# Patient Record
(demographics unavailable — no encounter records)

---

## 2024-10-09 NOTE — DISCUSSION/SUMMARY
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  - referral given for repeat DEXA scan - discussed other non-hormonal options available for osteoporosis aside from HRT

## 2024-10-09 NOTE — HISTORY OF PRESENT ILLNESS
[FreeTextEntry1] :    23 yo with  roque syndrome presents for follow up. Reports is unsure if mosaic or classical roque syndrome. States began going through puberty around 12-14 yo with breast development and pubic hair, states she had 1 period in middle school and then never again. She was seen by a GYN approx 8-9 years ago who recommended OCP's but she never took them  She had a bone density scan done 4/2022 showing osteopenia. H/o T2dm on insulin, recently started taking Mounjaro 1-2 months ago. Most recent A1c improved from previous H/o ulcerative colitis  She has never been sexually active and never had a pelvic exam.  She was previously seen and prescribed HRT however did not take them. She is following with hepatology for elevated liver enzymes with negative work up, likely hepatic steatosis.   She denies any complaints today.

## 2024-12-12 NOTE — PHYSICAL EXAM
[Obese] : obese [No Acute Distress] : no acute distress [No Respiratory Distress] : no respiratory distress [No Accessory Muscle Use] : no accessory muscle use [Regular Rhythm] : with a regular rhythm [No Edema] : no peripheral edema [Oriented x3] : oriented to person, place, and time

## 2025-01-30 NOTE — ASSESSMENT
[FreeTextEntry1] : The patient had Mckeon's Syndrome, obesity, DM and ulcerative colitis. She had Covid in 2020 and DM and ulcerative colitis was diagnosed at that time. She has a history of having a heart murmur and she has been followed by cardiology at [facility]. She has a murmur at the LSB without change with respiration. She has normal BP with equal pulses throughout. She has increased LDL and HDL. LFT's transaminases are increased significantly cannot go on statins.  CAC score is 0 however. The patient has a NAFL and is followed by hepatology. She has gained weight. Echo shows as well as exam that the patient is essentially euvolemic. She may have a bicuspid AV with very mild gradient. In view of not being able to see the AV structure and there is a gradient will get a CMR for this. This will better delineate anatomy. Will get venous doppler of legs.  The patient has a bicuspid AV . There is no significant AS or AI  Venous doppler showed no DVT .   Plan:  Follow up in 6 months .  Blood work  Echocardigoram prior next visit.

## 2025-01-30 NOTE — REVIEW OF SYSTEMS
[SOB] : shortness of breath [Dyspnea on exertion] : dyspnea during exertion [Joint Pain] : joint pain [Negative] : Heme/Lymph [Chest Discomfort] : no chest discomfort

## 2025-01-30 NOTE — HISTORY OF PRESENT ILLNESS
[FreeTextEntry1] : 22-year-old female PMH: Mckeon's syndrome, UC, HLD, MU, DM  -The patient has history of Mckeon's Syndrome with known ulcerative colitis, obesity, amenorrhea and known heart murmur. -She has increased cholesterol and marked increase in her LFT's as well. S -She has AS and may have a bicuspid AV.   Patient presents for follow up visit. No chest pain, shortness of breath, palpitations, dizziness, syncope. Reports continued LE edema, left> right. Patient states episodes of having a flushed feeling in her face has resolved.  9/12/24 A1c 6.9% TSH 4.19   HDL 60  AST 43 ALT 53 Alk Phos 135

## 2025-01-30 NOTE — CARDIOLOGY SUMMARY
[de-identified] : 2-5-2024 NSR Normal ECG .  5-6-2024 NSR NS  Twave change .  1- SR 91bpm with sinus arrythmia  [de-identified] : 2-9-2024 EF 59% Trace MR [place] nd mean gradient of 16 mmhg and 8.6 mmhg c/w mild AS cannot R/O bicuspid AV mild TR RVSPw as 13 mmhg . IVC was normal .  [de-identified] : CMRI 11-: Bicuspid AV. No AS or AR. Normal LVEF 62%.  4- CAC score 0  [de-identified] : Venous Duplex 11-: Normal no evidence of B/L DVT

## 2025-01-30 NOTE — REASON FOR VISIT
[CV Risk Factors and Non-Cardiac Disease] : CV risk factors and non-cardiac disease [Structural Heart and Valve Disease] : structural heart and valve disease [FreeTextEntry3] : Dr. Perdomo

## 2025-03-14 NOTE — ASSESSMENT
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  Elevated liver tests Had elevation 4 x ULN transaminases and mild ALP elevation Sept 2022 - Sept 2023 ALT mild elevation 2024 Denies new medications during that period. AUBRIE AMA ASMA negative Hepatitis A Ig M HBsAg HCV ab negative 2022 With ulcerative colitis PSC or overlap will need to be considered. Had MR abdomen May 2022 limited study but no obvious abnormality. Patient had uncontrolled DM during this period and liver tests improved with diabetes control which may have diabetic hepatopathy as marked improvement in liver tests with control Repeat studies for CLD show only mild elevation on ACE level. Autoimmune testing negative. HBsAg HCV ab negative. iron sat 20 % A1AT - MM MR limited study showed steatosis and no simon dil. Has not started statin or hormonal therapy. Since liver tests remain stable and has evidence of steatotic liver disease hold off on liver biopsy. MR limited by motion in May will repeat US showed hepatic steatosis will defer repeat MR abdomen Has UC hx.  MASLD Obese with T2DM and has fatty liver on imaging. Marked elevated liver tests in 2022 -2023 not due to MASLD but has mild elevation of transaminases now. Fibroscan - shows S3 steatosis and no fibrosis. PORTER fibrosure shows S2 - 3 steatosis and no fibrosis. A1AT ceruloplasmin HCV Repeat Fibroscan showed  S3 steatosis LS 6.6 F0- F1  Treatment - On Monjuara 10 mg weekly  Nutrition - low carb high protein / Mediterranean diet Limit red meat Exercise 5 days a week with aerobic activity 30 - 45 minutes 3 day and non aerobic activity like weights, resistance 2 times a week.  UC on Xeljanz. Follow up with Dr Vasquez.  T2DM on metformin and Monjauro.   Health maintenance HBV HCV screening negative in 2022 Has immunity for hepatitis A Does not have immunity and reordered vaccination for hepatitis B again Colonoscopy done in Oct 2022  Follow up in 6 months.

## 2025-03-14 NOTE — HISTORY OF PRESENT ILLNESS
[___ Month(s) Ago] : [unfilled] month(s) ago [___] : Performed [unfilled] [de-identified] : 9/12/24 [de-identified] : 22 y o F with Turners T2DM UC with elevated liver tests seen in follow up  Hospitalized with rectal bleeding in June 2020 and diagnosed with ulcerative colitis. Treated with steroids followed by Humira. Patient was started on Xeljanz in 2021. Was on Trulicity in 2023 briefly and changed to Monjuara in Oct 2023. Not yet started progesterone and estrogen medication. Not on statin. No use of herbal teas, supplements or antibiotics  No new complaints.  No confusion jaundice hematemesis or melena. No nausea vomiting constipation diarrhea or abdominal pain. On Mounjaro 10 mg.  No hematochezia No dysphagia or odynophagia or acid reflux. No fatigue. Wt fluctuates. No wt loss recenlty.  [de-identified] : Liver: 16 cm in length.  Diffusely echogenic.  Normal in contour.  No focal intrahepatic mass or intrahepatic bile duct dilatation is seen. Bile ducts: Normal caliber. Common bile duct measures 4 mm. Gallbladder: No cholelithiasis.  The gallbladder wall measures 1.4 mm, within normal limits.  No mariam-cholecystic fluid.  Negative sonographic Rushing sign. Pancreas: Visualized portions are within normal limits. Right kidney: 9.2 cm. No hydronephrosis. Vascular flow is demonstrated at the right renal hilum and within the right kidney. Ascites: None. IVC: Visualized portions are within normal limits. [FreeTextEntry1] : MRI: Study significantly limited by motion. No biliary ductal dilatation. MRCP images are severely degraded by motion, limiting assessment for stricturing. Hepatic steatosis., Performed 5/20/24.  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  No liver disease or liver cancer in family Pancreatic ca in maternal GM Pertinent Labs: Performed MRI: Liver mild enlarged smooth marked steatosis No evidence of PSC., Performed Oct 2022.  Had normal transaminases with  in June 2020 and mild elevation of AST - 42 only in July 2020. Had elevation of liver tests from Sept 2022 (    ) to Sept 2023(   ALT 15&). Peak was seen in Oct 2022 with   . Labs in Feb 2024 showed  AST 37 ALT 45. HBa1c June 2020 12.5 Sept 2023 8.

## 2025-03-14 NOTE — HISTORY OF PRESENT ILLNESS
[___ Month(s) Ago] : [unfilled] month(s) ago [___] : Performed [unfilled] [de-identified] : 9/12/24 [de-identified] : 22 y o F with Turners T2DM UC with elevated liver tests seen in follow up  Hospitalized with rectal bleeding in June 2020 and diagnosed with ulcerative colitis. Treated with steroids followed by Humira. Patient was started on Xeljanz in 2021. Was on Trulicity in 2023 briefly and changed to Monjuara in Oct 2023. Not yet started progesterone and estrogen medication. Not on statin. No use of herbal teas, supplements or antibiotics  No new complaints.  No confusion jaundice hematemesis or melena. No nausea vomiting constipation diarrhea or abdominal pain. On Mounjaro 10 mg.  No hematochezia No dysphagia or odynophagia or acid reflux. No fatigue. Wt fluctuates. No wt loss recenlty.  [de-identified] : Liver: 16 cm in length.  Diffusely echogenic.  Normal in contour.  No focal intrahepatic mass or intrahepatic bile duct dilatation is seen. Bile ducts: Normal caliber. Common bile duct measures 4 mm. Gallbladder: No cholelithiasis.  The gallbladder wall measures 1.4 mm, within normal limits.  No mariam-cholecystic fluid.  Negative sonographic Rushing sign. Pancreas: Visualized portions are within normal limits. Right kidney: 9.2 cm. No hydronephrosis. Vascular flow is demonstrated at the right renal hilum and within the right kidney. Ascites: None. IVC: Visualized portions are within normal limits. [FreeTextEntry1] : MRI: Study significantly limited by motion. No biliary ductal dilatation. MRCP images are severely degraded by motion, limiting assessment for stricturing. Hepatic steatosis., Performed 5/20/24.  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  No liver disease or liver cancer in family Pancreatic ca in maternal GM Pertinent Labs: Performed MRI: Liver mild enlarged smooth marked steatosis No evidence of PSC., Performed Oct 2022.  Had normal transaminases with  in June 2020 and mild elevation of AST - 42 only in July 2020. Had elevation of liver tests from Sept 2022 (    ) to Sept 2023(   ALT 15&). Peak was seen in Oct 2022 with   . Labs in Feb 2024 showed  AST 37 ALT 45. HBa1c June 2020 12.5 Sept 2023 8.

## 2025-03-14 NOTE — REVIEW OF SYSTEMS
[Fever] : no fever [Chills] : no chills [Recent Weight Loss (___ Lbs)] : no recent weight loss [Eye Pain] : no eye pain [Red Eyes] : eyes not red [Earache] : no earache [Loss Of Hearing] : no hearing loss [Chest Pain] : no chest pain [Palpitations] : no palpitations [Cough] : no cough [SOB on Exertion] : no shortness of breath during exertion [As Noted in HPI] : as noted in HPI [Joint Swelling] : no joint swelling [Skin Lesions] : no skin lesions [Confused] : no confusion [Convulsions] : no convulsions [Easy Bleeding] : no tendency for easy bleeding [Easy Bruising] : no tendency for easy bruising

## 2025-03-14 NOTE — PHYSICAL EXAM
[Liver Size (___ Cm)] : Liver size [unfilled] cm [General Appearance - Alert] : alert [General Appearance - Well Nourished] : well nourished [General Appearance - Well Developed] : well developed [Sclera] : the sclera and conjunctiva were normal [Outer Ear] : the ears and nose were normal in appearance [Neck Appearance] : the appearance of the neck was normal [Neck Cervical Mass (___cm)] : no neck mass was observed [Jugular Venous Distention Increased] : there was no jugular-venous distention [Thyroid Diffuse Enlargement] : the thyroid was not enlarged [Respiration, Rhythm And Depth] : normal respiratory rhythm and effort [Auscultation Breath Sounds / Voice Sounds] : lungs were clear to auscultation bilaterally [Heart Rate And Rhythm] : heart rate was normal and rhythm regular [Heart Sounds] : normal S1 and S2 [Edema] : there was no peripheral edema [Abdomen Soft] : soft [Abdomen Tenderness] : non-tender [Cervical Lymph Nodes Enlarged Posterior Bilaterally] : posterior cervical [Cervical Lymph Nodes Enlarged Anterior Bilaterally] : anterior cervical [Supraclavicular Lymph Nodes Enlarged Bilaterally] : supraclavicular [Abnormal Walk] : normal gait [Nail Clubbing] : no clubbing  or cyanosis of the fingernails [] : no rash [No Focal Deficits] : no focal deficits [Oriented To Time, Place, And Person] : oriented to person, place, and time [Scleral Icterus] : No Scleral Icterus [Spider Angioma] : No spider angioma(s) were observed [Abdominal  Ascites] : no ascites [Splenomegaly] : no splenomegaly [Liver Palpable ___ Finger Breadths Below Costal Margin] : was not palpable below costal margin [Jaundice] : No jaundice [FreeTextEntry1] : ejection systolic  click + ? systolic murmur